# Patient Record
Sex: MALE | Race: OTHER
[De-identification: names, ages, dates, MRNs, and addresses within clinical notes are randomized per-mention and may not be internally consistent; named-entity substitution may affect disease eponyms.]

---

## 2020-03-24 ENCOUNTER — HOSPITAL ENCOUNTER (INPATIENT)
Dept: HOSPITAL 15 - ER | Age: 75
LOS: 3 days | Discharge: HOME | DRG: 720 | End: 2020-03-27
Attending: INTERNAL MEDICINE | Admitting: NURSE PRACTITIONER
Payer: MEDICAID

## 2020-03-24 VITALS — BODY MASS INDEX: 19.28 KG/M2 | WEIGHT: 115.74 LBS | HEIGHT: 65 IN

## 2020-03-24 VITALS — SYSTOLIC BLOOD PRESSURE: 129 MMHG | DIASTOLIC BLOOD PRESSURE: 73 MMHG

## 2020-03-24 DIAGNOSIS — Z79.4: ICD-10-CM

## 2020-03-24 DIAGNOSIS — D64.9: ICD-10-CM

## 2020-03-24 DIAGNOSIS — E11.22: ICD-10-CM

## 2020-03-24 DIAGNOSIS — F03.90: ICD-10-CM

## 2020-03-24 DIAGNOSIS — F17.200: ICD-10-CM

## 2020-03-24 DIAGNOSIS — D63.8: ICD-10-CM

## 2020-03-24 DIAGNOSIS — Z83.3: ICD-10-CM

## 2020-03-24 DIAGNOSIS — N17.0: ICD-10-CM

## 2020-03-24 DIAGNOSIS — A41.9: Primary | ICD-10-CM

## 2020-03-24 DIAGNOSIS — E44.0: ICD-10-CM

## 2020-03-24 DIAGNOSIS — K52.9: ICD-10-CM

## 2020-03-24 DIAGNOSIS — E11.65: ICD-10-CM

## 2020-03-24 DIAGNOSIS — Z90.49: ICD-10-CM

## 2020-03-24 DIAGNOSIS — E87.1: ICD-10-CM

## 2020-03-24 DIAGNOSIS — G93.41: ICD-10-CM

## 2020-03-24 DIAGNOSIS — Z79.899: ICD-10-CM

## 2020-03-24 DIAGNOSIS — N18.3: ICD-10-CM

## 2020-03-24 LAB
ALBUMIN SERPL-MCNC: 3.2 G/DL (ref 3.4–5)
ALCOHOL, URINE: < 3 MG/DL (ref 0–5)
ALP SERPL-CCNC: 379 U/L (ref 45–117)
ALT SERPL-CCNC: 151 U/L (ref 16–61)
AMPHETAMINES UR QL SCN: NEGATIVE
ANION GAP SERPL CALCULATED.3IONS-SCNC: 11 MMOL/L (ref 5–15)
APTT PPP: 23.9 SEC (ref 23.64–32.05)
BARBITURATES UR QL SCN: NEGATIVE
BENZODIAZ UR QL SCN: NEGATIVE
BILIRUB SERPL-MCNC: 1.9 MG/DL (ref 0.2–1)
BUN SERPL-MCNC: 24 MG/DL (ref 7–18)
BUN/CREAT SERPL: 13.5
BZE UR QL SCN: NEGATIVE
CALCIUM SERPL-MCNC: 9 MG/DL (ref 8.5–10.1)
CANNABINOIDS UR QL SCN: NEGATIVE
CHLORIDE SERPL-SCNC: 93 MMOL/L (ref 98–107)
CO2 SERPL-SCNC: 27 MMOL/L (ref 21–32)
GLUCOSE SERPL-MCNC: 443 MG/DL (ref 74–106)
HCT VFR BLD AUTO: 36.2 % (ref 41–53)
HGB BLD-MCNC: 12.2 G/DL (ref 13.5–17.5)
INR PPP: 1.03 (ref 0.9–1.15)
LACTATE PLASV-SCNC: 4.6 MMOL/L (ref 0.4–2)
MAGNESIUM SERPL-MCNC: 2.4 MG/DL (ref 1.6–2.6)
MCH RBC QN AUTO: 29.9 PG (ref 28–32)
MCV RBC AUTO: 88.9 FL (ref 80–100)
NRBC BLD QL AUTO: 0 %
OPIATES UR QL SCN: NEGATIVE
PCP UR QL SCN: NEGATIVE
POTASSIUM SERPL-SCNC: 4.3 MMOL/L (ref 3.5–5.1)
PROT SERPL-MCNC: 8.9 G/DL (ref 6.4–8.2)
SODIUM SERPL-SCNC: 131 MMOL/L (ref 136–145)

## 2020-03-24 PROCEDURE — 85610 PROTHROMBIN TIME: CPT

## 2020-03-24 PROCEDURE — 80061 LIPID PANEL: CPT

## 2020-03-24 PROCEDURE — 81001 URINALYSIS AUTO W/SCOPE: CPT

## 2020-03-24 PROCEDURE — 80320 DRUG SCREEN QUANTALCOHOLS: CPT

## 2020-03-24 PROCEDURE — 84443 ASSAY THYROID STIM HORMONE: CPT

## 2020-03-24 PROCEDURE — 87040 BLOOD CULTURE FOR BACTERIA: CPT

## 2020-03-24 PROCEDURE — 80053 COMPREHEN METABOLIC PANEL: CPT

## 2020-03-24 PROCEDURE — 96375 TX/PRO/DX INJ NEW DRUG ADDON: CPT

## 2020-03-24 PROCEDURE — 83690 ASSAY OF LIPASE: CPT

## 2020-03-24 PROCEDURE — 93886 INTRACRANIAL COMPLETE STUDY: CPT

## 2020-03-24 PROCEDURE — 87081 CULTURE SCREEN ONLY: CPT

## 2020-03-24 PROCEDURE — 76700 US EXAM ABDOM COMPLETE: CPT

## 2020-03-24 PROCEDURE — 97163 PT EVAL HIGH COMPLEX 45 MIN: CPT

## 2020-03-24 PROCEDURE — 85730 THROMBOPLASTIN TIME PARTIAL: CPT

## 2020-03-24 PROCEDURE — 80076 HEPATIC FUNCTION PANEL: CPT

## 2020-03-24 PROCEDURE — 87804 INFLUENZA ASSAY W/OPTIC: CPT

## 2020-03-24 PROCEDURE — 82746 ASSAY OF FOLIC ACID SERUM: CPT

## 2020-03-24 PROCEDURE — 70486 CT MAXILLOFACIAL W/O DYE: CPT

## 2020-03-24 PROCEDURE — 96365 THER/PROPH/DIAG IV INF INIT: CPT

## 2020-03-24 PROCEDURE — 86708 HEPATITIS A ANTIBODY: CPT

## 2020-03-24 PROCEDURE — 82962 GLUCOSE BLOOD TEST: CPT

## 2020-03-24 PROCEDURE — 83605 ASSAY OF LACTIC ACID: CPT

## 2020-03-24 PROCEDURE — 93005 ELECTROCARDIOGRAM TRACING: CPT

## 2020-03-24 PROCEDURE — 87086 URINE CULTURE/COLONY COUNT: CPT

## 2020-03-24 PROCEDURE — 83735 ASSAY OF MAGNESIUM: CPT

## 2020-03-24 PROCEDURE — 86704 HEP B CORE ANTIBODY TOTAL: CPT

## 2020-03-24 PROCEDURE — 86706 HEP B SURFACE ANTIBODY: CPT

## 2020-03-24 PROCEDURE — 71250 CT THORAX DX C-: CPT

## 2020-03-24 PROCEDURE — 84484 ASSAY OF TROPONIN QUANT: CPT

## 2020-03-24 PROCEDURE — 96361 HYDRATE IV INFUSION ADD-ON: CPT

## 2020-03-24 PROCEDURE — 86803 HEPATITIS C AB TEST: CPT

## 2020-03-24 PROCEDURE — 80048 BASIC METABOLIC PNL TOTAL CA: CPT

## 2020-03-24 PROCEDURE — 83036 HEMOGLOBIN GLYCOSYLATED A1C: CPT

## 2020-03-24 PROCEDURE — 71045 X-RAY EXAM CHEST 1 VIEW: CPT

## 2020-03-24 PROCEDURE — 82140 ASSAY OF AMMONIA: CPT

## 2020-03-24 PROCEDURE — 70450 CT HEAD/BRAIN W/O DYE: CPT

## 2020-03-24 PROCEDURE — 74176 CT ABD & PELVIS W/O CONTRAST: CPT

## 2020-03-24 PROCEDURE — 87340 HEPATITIS B SURFACE AG IA: CPT

## 2020-03-24 PROCEDURE — 70551 MRI BRAIN STEM W/O DYE: CPT

## 2020-03-24 PROCEDURE — 85025 COMPLETE CBC W/AUTO DIFF WBC: CPT

## 2020-03-24 PROCEDURE — 36415 COLL VENOUS BLD VENIPUNCTURE: CPT

## 2020-03-24 PROCEDURE — 93306 TTE W/DOPPLER COMPLETE: CPT

## 2020-03-24 PROCEDURE — 80307 DRUG TEST PRSMV CHEM ANLYZR: CPT

## 2020-03-24 PROCEDURE — 96367 TX/PROPH/DG ADDL SEQ IV INF: CPT

## 2020-03-24 PROCEDURE — 82607 VITAMIN B-12: CPT

## 2020-03-24 RX ADMIN — SODIUM CHLORIDE SCH MLS/HR: 0.9 INJECTION, SOLUTION INTRAVENOUS at 19:50

## 2020-03-24 RX ADMIN — Medication SCH STRIP: at 20:17

## 2020-03-24 RX ADMIN — HUMAN INSULIN SCH UNITS: 100 INJECTION, SOLUTION SUBCUTANEOUS at 20:18

## 2020-03-24 NOTE — NUR
Admit to [facility]

CATA DOTSON admitted to [facility] via gurney on cardiac monitor. Patient transferred to bed, 
connected to unit monitoring , and weighed by bed scale. Patient is lethargic, fatigued, and 
not answering questions. Joshua cath draining yellow urine to gravity. Normal Saline infusing 
at 125 ml/h. Patient was just given Tylenol suppository prior to arriving on the unit. 
Physical assessment completed, see interventions. Attempted to Instruct on POC and to call 
for assist as needed. bed is in the lowest position with side rails up x2, Bed alarm on. 
Cooling measures in place.

## 2020-03-25 VITALS — DIASTOLIC BLOOD PRESSURE: 65 MMHG | SYSTOLIC BLOOD PRESSURE: 117 MMHG

## 2020-03-25 VITALS — SYSTOLIC BLOOD PRESSURE: 121 MMHG | DIASTOLIC BLOOD PRESSURE: 66 MMHG

## 2020-03-25 VITALS — DIASTOLIC BLOOD PRESSURE: 59 MMHG | SYSTOLIC BLOOD PRESSURE: 98 MMHG

## 2020-03-25 VITALS — SYSTOLIC BLOOD PRESSURE: 118 MMHG | DIASTOLIC BLOOD PRESSURE: 66 MMHG

## 2020-03-25 VITALS — DIASTOLIC BLOOD PRESSURE: 66 MMHG | SYSTOLIC BLOOD PRESSURE: 126 MMHG

## 2020-03-25 VITALS — SYSTOLIC BLOOD PRESSURE: 116 MMHG | DIASTOLIC BLOOD PRESSURE: 65 MMHG

## 2020-03-25 LAB
ALBUMIN SERPL-MCNC: 2.3 G/DL (ref 3.4–5)
ALP SERPL-CCNC: 256 U/L (ref 45–117)
ALT SERPL-CCNC: 163 U/L (ref 16–61)
ANION GAP SERPL CALCULATED.3IONS-SCNC: 7 MMOL/L (ref 5–15)
BILIRUB DIRECT SERPL-MCNC: 1.3 MG/DL (ref 0–0.2)
BILIRUB SERPL-MCNC: 1.7 MG/DL (ref 0.2–1)
BUN SERPL-MCNC: 19 MG/DL (ref 7–18)
BUN/CREAT SERPL: 16.1
CALCIUM SERPL-MCNC: 8.3 MG/DL (ref 8.5–10.1)
CHLORIDE SERPL-SCNC: 101 MMOL/L (ref 98–107)
CHOLEST SERPL-MCNC: 142 MG/DL (ref ?–200)
CO2 SERPL-SCNC: 26 MMOL/L (ref 21–32)
GLUCOSE SERPL-MCNC: 117 MG/DL (ref 74–106)
HCT VFR BLD AUTO: 31.5 % (ref 41–53)
HDLC SERPL-MCNC: 46 MG/DL (ref 40–59)
HGB BLD-MCNC: 10.7 G/DL (ref 13.5–17.5)
MCH RBC QN AUTO: 30.1 PG (ref 28–32)
MCV RBC AUTO: 88.7 FL (ref 80–100)
NRBC BLD QL AUTO: 0 %
POTASSIUM SERPL-SCNC: 3.6 MMOL/L (ref 3.5–5.1)
PROT SERPL-MCNC: 6.8 G/DL (ref 6.4–8.2)
SODIUM SERPL-SCNC: 134 MMOL/L (ref 136–145)
TRIGL SERPL-MCNC: 62 MG/DL (ref ?–150)

## 2020-03-25 RX ADMIN — SODIUM CHLORIDE SCH MLS/HR: 0.9 INJECTION, SOLUTION INTRAVENOUS at 13:22

## 2020-03-25 RX ADMIN — HUMAN INSULIN SCH UNITS: 100 INJECTION, SOLUTION SUBCUTANEOUS at 11:53

## 2020-03-25 RX ADMIN — Medication SCH STRIP: at 17:00

## 2020-03-25 RX ADMIN — HUMAN INSULIN SCH UNITS: 100 INJECTION, SOLUTION SUBCUTANEOUS at 04:00

## 2020-03-25 RX ADMIN — HUMAN INSULIN SCH UNITS: 100 INJECTION, SOLUTION SUBCUTANEOUS at 21:27

## 2020-03-25 RX ADMIN — HUMAN INSULIN SCH UNITS: 100 INJECTION, SOLUTION SUBCUTANEOUS at 00:00

## 2020-03-25 RX ADMIN — Medication SCH STRIP: at 00:00

## 2020-03-25 RX ADMIN — HUMAN INSULIN SCH UNITS: 100 INJECTION, SOLUTION SUBCUTANEOUS at 17:00

## 2020-03-25 RX ADMIN — Medication SCH STRIP: at 11:53

## 2020-03-25 RX ADMIN — Medication SCH STRIP: at 06:05

## 2020-03-25 RX ADMIN — ATORVASTATIN CALCIUM SCH MG: 20 TABLET, FILM COATED ORAL at 21:25

## 2020-03-25 RX ADMIN — Medication SCH STRIP: at 08:22

## 2020-03-25 RX ADMIN — SODIUM CHLORIDE SCH MLS/HR: 0.9 INJECTION, SOLUTION INTRAVENOUS at 11:15

## 2020-03-25 RX ADMIN — Medication SCH STRIP: at 21:27

## 2020-03-25 RX ADMIN — HUMAN INSULIN SCH UNITS: 100 INJECTION, SOLUTION SUBCUTANEOUS at 08:00

## 2020-03-25 RX ADMIN — SODIUM CHLORIDE SCH MLS/HR: 0.9 INJECTION, SOLUTION INTRAVENOUS at 06:05

## 2020-03-25 NOTE — NUR
MD:

Dr Nguyen at bedside to see patient.  Orders received.  MD to attempt to contact family to 
get patient's baseline neuro status.

## 2020-03-25 NOTE — NUR
CLOSING SHIFT NOTE:

Patient resting in bed, no s/s of pain.  Patient remain alert but unresponsive.  Report 
given to NOC Alyssa MONTEMAYOR.

## 2020-03-25 NOTE — NUR
PT at bedside to attempt eval. Asked to hold off due to language barrier and inability to 
follow commands.  PT will eval tomorrow if appropriate.

## 2020-03-25 NOTE — NUR
OPENING SHIFT NOTE:

Received report from NOC RNAlyssa.  Assumed care of patient.  Received patient lying in 
bed, connected to bedside monitor, no s/s of distress noted.  Patient is Ukrainian speaking.  
Denies pain when asked by shaking head.  Patient opens eyes to name, but doesn't answer 
questions when asked and inconsistent in following commands.  Patient on RA with O2 sats 
98%.  IVF of NS running at 125ml/hr in left hand #20 and has additional IV to left wrist 
#20.  Joshua draining to gravity yellow urine output.  Patient pending GI consult for 
abdominal pain.  Bed in lowest position, rails x2 up and call light within reach.  Will 
continue to monitor q1hr/PRN.

## 2020-03-25 NOTE — NUR
Patient returned from radiology escorted by SHREYAS Simpson RN.  Per RN, patient didn't respond 
when spoken to in Surinamese.  Patient remains alert but unresponsive.  Updated family on 
telephone via , [facility] radiology transporter, on plan of care.  All questions 
answered.

## 2020-03-25 NOTE — NUR
Shift Opening Note

Received patient laying in bed with eyes closed. Open eyes when touched and spoken to 
occasionally, Does not answer questions or follow commands. Joshua cath draining yellow urine 
to gravity. Normal Saline infusing at 100 ml/h.  Physical assessment completed, see 
interventions. Attempted to Instruct on POC and to call for assist as needed. Bed is in the 
lowest position with side rails up x2, Bed alarm on.

## 2020-03-25 NOTE — NUR
T/C from patient's family.  Password verified.  Updated family on plan of care.  Family 
states that prior to admission patient was able to have full conversations with them and 
when he stopped talking they brought him to the ER.

## 2020-03-25 NOTE — NUR
ATTEMPTED SWALLOW EVALUATION. CNA ASSISTING WITH Romanian TRANSLATION. PATIENT REFUSED TO 
OPEN MOUTH FOR SWALLOW EVALUATION. SEVERAL TYPES OF BOLUS ATTEMPTED WITH NO RESPONSE FROM 
PATIENT. UNABLE TO DETERMINE IF PATIENT IS SAFE FOR PO INTAKE. NURSING NOTIFIED.

## 2020-03-25 NOTE — NUR
1315 medications held due to patient not responding and following directions.  Swallow eval 
pending.

## 2020-03-25 NOTE — NUR
END OF SHIFT

PATIENT IS LAYING IN BED WITH EYES CLOSED. NO SOB, DISTRESS OR PAIN NOTED. VS STABLE.

WILL GIVE REPORT AND ENDORSE CARE TO THE DAY SHIFT RN.

## 2020-03-26 VITALS — DIASTOLIC BLOOD PRESSURE: 73 MMHG | SYSTOLIC BLOOD PRESSURE: 128 MMHG

## 2020-03-26 VITALS — DIASTOLIC BLOOD PRESSURE: 65 MMHG | SYSTOLIC BLOOD PRESSURE: 116 MMHG

## 2020-03-26 VITALS — SYSTOLIC BLOOD PRESSURE: 107 MMHG | DIASTOLIC BLOOD PRESSURE: 67 MMHG

## 2020-03-26 VITALS — DIASTOLIC BLOOD PRESSURE: 67 MMHG | SYSTOLIC BLOOD PRESSURE: 122 MMHG

## 2020-03-26 VITALS — DIASTOLIC BLOOD PRESSURE: 58 MMHG | SYSTOLIC BLOOD PRESSURE: 113 MMHG

## 2020-03-26 VITALS — SYSTOLIC BLOOD PRESSURE: 108 MMHG | DIASTOLIC BLOOD PRESSURE: 59 MMHG

## 2020-03-26 VITALS — SYSTOLIC BLOOD PRESSURE: 128 MMHG | DIASTOLIC BLOOD PRESSURE: 73 MMHG

## 2020-03-26 LAB
ALBUMIN SERPL-MCNC: 2.3 G/DL (ref 3.4–5)
ALP SERPL-CCNC: 250 U/L (ref 45–117)
ALT SERPL-CCNC: 154 U/L (ref 16–61)
ANION GAP SERPL CALCULATED.3IONS-SCNC: 6 MMOL/L (ref 5–15)
BILIRUB SERPL-MCNC: 1.6 MG/DL (ref 0.2–1)
BUN SERPL-MCNC: 19 MG/DL (ref 7–18)
BUN/CREAT SERPL: 19.4
CALCIUM SERPL-MCNC: 8.1 MG/DL (ref 8.5–10.1)
CHLORIDE SERPL-SCNC: 100 MMOL/L (ref 98–107)
CHOLEST SERPL-MCNC: 142 MG/DL (ref ?–200)
CO2 SERPL-SCNC: 26 MMOL/L (ref 21–32)
GLUCOSE SERPL-MCNC: 71 MG/DL (ref 74–106)
HCT VFR BLD AUTO: 33 % (ref 41–53)
HDLC SERPL-MCNC: 49 MG/DL (ref 40–59)
HGB BLD-MCNC: 11.1 G/DL (ref 13.5–17.5)
MCH RBC QN AUTO: 29.9 PG (ref 28–32)
MCV RBC AUTO: 89 FL (ref 80–100)
NRBC BLD QL AUTO: 0 %
POTASSIUM SERPL-SCNC: 3.6 MMOL/L (ref 3.5–5.1)
PROT SERPL-MCNC: 6.6 G/DL (ref 6.4–8.2)
SODIUM SERPL-SCNC: 132 MMOL/L (ref 136–145)
TRIGL SERPL-MCNC: 67 MG/DL (ref ?–150)

## 2020-03-26 RX ADMIN — HUMAN INSULIN SCH UNITS: 100 INJECTION, SOLUTION SUBCUTANEOUS at 11:30

## 2020-03-26 RX ADMIN — SODIUM CHLORIDE SCH MLS/HR: 0.9 INJECTION, SOLUTION INTRAVENOUS at 05:46

## 2020-03-26 RX ADMIN — HUMAN INSULIN SCH UNITS: 100 INJECTION, SOLUTION SUBCUTANEOUS at 16:44

## 2020-03-26 RX ADMIN — Medication SCH STRIP: at 16:44

## 2020-03-26 RX ADMIN — Medication SCH STRIP: at 22:07

## 2020-03-26 RX ADMIN — ASPIRIN SCH MG: 81 TABLET ORAL at 09:46

## 2020-03-26 RX ADMIN — PANTOPRAZOLE SODIUM SCH MG: 40 TABLET, DELAYED RELEASE ORAL at 09:46

## 2020-03-26 RX ADMIN — HUMAN INSULIN SCH UNITS: 100 INJECTION, SOLUTION SUBCUTANEOUS at 22:36

## 2020-03-26 RX ADMIN — SODIUM CHLORIDE SCH MLS/HR: 0.9 INJECTION, SOLUTION INTRAVENOUS at 09:04

## 2020-03-26 RX ADMIN — Medication SCH STRIP: at 05:39

## 2020-03-26 RX ADMIN — ENOXAPARIN SODIUM SCH MG: 40 INJECTION SUBCUTANEOUS at 09:18

## 2020-03-26 RX ADMIN — Medication SCH STRIP: at 11:38

## 2020-03-26 RX ADMIN — ATORVASTATIN CALCIUM SCH MG: 20 TABLET, FILM COATED ORAL at 22:07

## 2020-03-26 RX ADMIN — HUMAN INSULIN SCH UNITS: 100 INJECTION, SOLUTION SUBCUTANEOUS at 05:39

## 2020-03-26 RX ADMIN — DEXTROSE AND SODIUM CHLORIDE SCH MLS/HR: 5; 900 INJECTION, SOLUTION INTRAVENOUS at 15:25

## 2020-03-26 NOTE — NUR
SPOKE WITH DR NAVARRETE

NEW ORDERS RECEIVED, WILL INFORM MRI DEPARTMENT OF PATIENTS TELEMETRY STATUS

## 2020-03-26 NOTE — NUR
OPENING SHIFT NOTE:

Received report from NOC RNAlyssa.  Assumed care of patient.  Received patient lying in 
bed, connected to bedside monitor, no s/s of distress noted.  Patient is Arabic speaking.  
Denies pain when asked.  Patient opens eyes to name, but doesn't answer questions when asked 
and inconsistent in following commands.  Patient on RA with O2 sats 98%.  IVF of NS running 
at 100ml/hr in left hand #20 and has additional IV to left wrist #20.  Joshua draining to 
gravity yellow urine output.  Patient pending MRI brain, swallow eval and PT eval.  Bed in 
lowest position, rails x2 up and call light within reach.  Will continue to monitor 
q1hr/PRN.

## 2020-03-26 NOTE — NUR
MRI TRANSPORT TEAM AT BEDSIDE TO TAKE PATIENT TO MRI

TRANSFERRED TO [facility], PATIENT TOLERATED TRANSFER. VITALS REMAINED STABLE.

## 2020-03-26 NOTE — NUR
Report given to ALBINA Lyons.  Patient to be transferred to [address] after returning from 
radiology for CT sinus.  Personal belonging and chart to be walked over to unit.

## 2020-03-26 NOTE — NUR
Pt Arrived on Unit

Pt arrived on unit to bed 89B. Pt is a/o; hard to tell level due to pt's current condition. 
Safety measures maintained with call light within reach, bed in lowest position and side 
rails up. Will continue to monitor.

## 2020-03-26 NOTE — NUR
Pt Given Tray

Under my observation, pt was able to eat clear liquid tray provided to him. Placed pt in 
high fowlers and assisted pt in eating his tray. Pt has no difficulty eating and no s/s of 
swallowing difficulties noted. Will continue to monitor and endorse to NOC shift.

## 2020-03-26 NOTE — NUR
Opening Shift Note

Assumed care of patient, awake and alert. Pt sitting in the upright position talking on the 
phone to family. Patient is Hong Konger speaking. No S/S of distress/SOB or pain.  Instructed on 
POC and to call for assist PRN, will continue to monitor for changes Q1hr and PRN. Patient 
in lowest possible position with bed rails up x2 and call light within reach.

## 2020-03-26 NOTE — NUR
EEG- UNABLE TO COMPLETE DUE TO PT'S UNABLE TO FOLLOW COMMANDS. WILL ATTEMPT AGAIN TOMORROW 
ON 03/27/2020.

## 2020-03-26 NOTE — NUR
Unable to give patient oral medications due at 1000.  Patient is alert but still not 
responding verbally and following commands.  Swallow eval is still pending after patient not 
being cooperative on previous attempt.

## 2020-03-26 NOTE — NUR
Pt is currently listed as having no current insurance. Wife states pt is retired.



Discussed with patient eligibility options for medical coverage based on their current 
situation. Pt referred to Samson Shea, [employer]ant, to assist with process for 
[employer] insurance coverage. Advised pt that additional information regarding d/c planning 
would be provided prior to their discharge. Will Follow up with Samson regarding the 
insurance status.


-------------------------------------------------------------------------------

Addendum: 03/26/20 at 1315 by RUSS STANLEY SS

-------------------------------------------------------------------------------

Amended: Links added.

## 2020-03-26 NOTE — NUR
MORNING HYGIENE CARE

FULL BED BATH PERFORMED USING WARM SOAPY WASH CLOTHES. GOWN CHANGED. PARTIAL LINEN CHANGED.

PATIENT REPOSITIONED FOR COMFORT. TOLERATED IT WELL.

## 2020-03-27 VITALS — DIASTOLIC BLOOD PRESSURE: 64 MMHG | SYSTOLIC BLOOD PRESSURE: 110 MMHG

## 2020-03-27 VITALS — SYSTOLIC BLOOD PRESSURE: 113 MMHG | DIASTOLIC BLOOD PRESSURE: 62 MMHG

## 2020-03-27 VITALS — DIASTOLIC BLOOD PRESSURE: 62 MMHG | SYSTOLIC BLOOD PRESSURE: 113 MMHG

## 2020-03-27 VITALS — SYSTOLIC BLOOD PRESSURE: 105 MMHG | DIASTOLIC BLOOD PRESSURE: 68 MMHG

## 2020-03-27 LAB
ALBUMIN SERPL-MCNC: 2.3 G/DL (ref 3.4–5)
ALP SERPL-CCNC: 241 U/L (ref 45–117)
ALT SERPL-CCNC: 122 U/L (ref 16–61)
BILIRUB DIRECT SERPL-MCNC: 1.2 MG/DL (ref 0–0.2)
BILIRUB SERPL-MCNC: 1.5 MG/DL (ref 0.2–1)
HCT VFR BLD AUTO: 32.5 % (ref 41–53)
HGB BLD-MCNC: 11.2 G/DL (ref 13.5–17.5)
MCH RBC QN AUTO: 30.4 PG (ref 28–32)
MCV RBC AUTO: 87.9 FL (ref 80–100)
NRBC BLD QL AUTO: 0.1 %
PROT SERPL-MCNC: 6.6 G/DL (ref 6.4–8.2)

## 2020-03-27 RX ADMIN — Medication SCH STRIP: at 11:10

## 2020-03-27 RX ADMIN — HUMAN INSULIN SCH UNITS: 100 INJECTION, SOLUTION SUBCUTANEOUS at 11:16

## 2020-03-27 RX ADMIN — DEXTROSE AND SODIUM CHLORIDE SCH MLS/HR: 5; 900 INJECTION, SOLUTION INTRAVENOUS at 08:30

## 2020-03-27 RX ADMIN — HUMAN INSULIN SCH UNITS: 100 INJECTION, SOLUTION SUBCUTANEOUS at 06:19

## 2020-03-27 RX ADMIN — Medication SCH STRIP: at 06:16

## 2020-03-27 RX ADMIN — ENOXAPARIN SODIUM SCH MG: 40 INJECTION SUBCUTANEOUS at 09:32

## 2020-03-27 RX ADMIN — PANTOPRAZOLE SODIUM SCH MG: 40 TABLET, DELAYED RELEASE ORAL at 09:32

## 2020-03-27 RX ADMIN — ASPIRIN SCH MG: 81 TABLET ORAL at 09:32

## 2020-03-27 NOTE — NUR
Opening Note

Assumed pt care from NOC nurse. Pt is a/ox3-4; hard to tell due to language barrier. Pt is 
able to follow commands and responds by shaking head yes or no, but does not respond to 
questions all of the time. Pt is currently sitting in high fowlers eating breakfast with no 
difficulty; assessed pt while eating and he is having no difficulty swallowing. Discussed 
POC with pt. Safety measures maintained with call light within reach, bed in lowest position 
and side rails up. Will continue to monitor.

## 2020-03-27 NOTE — NUR
Contacted Pt's Daughter

Spoke with pt's daughterGoldie at [phone]. They are aware of pt's d/c. Spoke with 
them about POC and told them that I would contact them once the d/c was complete and pt is 
ready for . Pt's daughter verbalized understanding. 

-------------------------------------------------------------------------------

Addendum: 03/27/20 at 1303 by JESICA BEST RN RN

-------------------------------------------------------------------------------

Spoke with pt's daughter again. Aware of d/c and states on her way to  pt.

## 2020-03-27 NOTE — NUR
Nutrition Assessment Notes



Please refer to link for full assessment notes.



Est energy needs: 7818-7601 kcals (25-30 kcal/kgBW)

Est protein needs: 53-58 gms/day (1.0-1.1 gm/kgBW)



Will continue to monitor and reassess prn.

-------------------------------------------------------------------------------

Addendum: 03/27/20 at 1411 by Surekha Rodriguez RD

-------------------------------------------------------------------------------

Amended: Links added.

## 2020-03-27 NOTE — NUR
Pt D/C'ed off Unit

Pt d/c'ed off unit via wheelchair. Pt is a/ox3-4 at the time of discharge; hard to tell due 
to language barrier. All d/c infor given to pt's daughter and granddaughter. All questions 
were answered. Family is aware of pt's need to f/u with a PCP. Pt has all belongings and 
prescriptions.

## 2020-03-27 NOTE — NUR
Dr Polanco at Bedside

MD to see pt. Aware of pt tolerating diet and no complaints at this time. MD will speak to 
pt's family and possibly d/c home today. Will continue to monitor.

## 2020-03-27 NOTE — NUR
IVs DC'ed and Tele 74 Removed and Sent Back

IVs to pt's L wrist and hand d/c'ed. Catheters were removed fully intact. Sites are 
asymptomatic. Pressure was applied to both sites for 3 minutes with gauze and then wrapped 
in coban. Pt instructed to keep dressings on for 30 minutes.





Tele 74 removed and sent back to ICU

Staff made aware.

## 2020-03-27 NOTE — NUR
Joshua Catheter D/C'ed

Joshua catheter d/c'ed for pt's d/c. 850mL of urine in bag. Pt instructed to use urinal and 
to call if needs assistance. Will continue to monitor.

## 2020-03-27 NOTE — NUR
Dr Bernabe at Bedside

MD to see pt. Informed pt of eating and no complains associated with eating.

No new orders at this time.

## 2020-03-27 NOTE — NUR
Ambulation

Per Mel with PT, pt was able to ambulate in child with standby assist. Will continue to 
enforce ambulation. Will continue to monitor.